# Patient Record
Sex: MALE | HISPANIC OR LATINO | URBAN - METROPOLITAN AREA
[De-identification: names, ages, dates, MRNs, and addresses within clinical notes are randomized per-mention and may not be internally consistent; named-entity substitution may affect disease eponyms.]

---

## 2024-05-01 ENCOUNTER — OFFICE (OUTPATIENT)
Facility: LOCATION | Age: 19
Setting detail: OPHTHALMOLOGY
End: 2024-05-01
Payer: COMMERCIAL

## 2024-05-01 DIAGNOSIS — H52.223: ICD-10-CM

## 2024-05-01 DIAGNOSIS — H50.012: ICD-10-CM

## 2024-05-01 DIAGNOSIS — Q15.9: ICD-10-CM

## 2024-05-01 PROBLEM — H52.13 MYOPIA; BOTH EYES: Status: ACTIVE | Noted: 2024-05-01

## 2024-05-01 PROBLEM — G40.909 SEIZURES, RECURRENT: Status: ACTIVE | Noted: 2024-05-01

## 2024-05-01 PROBLEM — H53.023 AMBLYOPIA REFRACTIVE; BOTH EYES: Status: ACTIVE | Noted: 2024-05-01

## 2024-05-01 PROBLEM — H55.02 NYSTAGMUS, LATENT: Status: ACTIVE | Noted: 2024-05-01

## 2024-05-01 PROCEDURE — 92060 SENSORIMOTOR EXAMINATION: CPT | Performed by: OPHTHALMOLOGY

## 2024-05-01 PROCEDURE — 99204 OFFICE O/P NEW MOD 45 MIN: CPT | Performed by: OPHTHALMOLOGY

## 2024-05-01 PROCEDURE — 92250 FUNDUS PHOTOGRAPHY W/I&R: CPT | Performed by: OPHTHALMOLOGY

## 2024-05-01 PROCEDURE — 92285 EXTERNAL OCULAR PHOTOGRAPHY: CPT | Performed by: OPHTHALMOLOGY

## 2024-05-01 PROCEDURE — 92015 DETERMINE REFRACTIVE STATE: CPT | Performed by: OPHTHALMOLOGY

## 2024-05-01 ASSESSMENT — CONFRONTATIONAL VISUAL FIELD TEST (CVF)
OD_FINDINGS: FULL
OS_FINDINGS: FULL

## 2024-07-01 ENCOUNTER — OFFICE (OUTPATIENT)
Facility: LOCATION | Age: 19
Setting detail: OPHTHALMOLOGY
End: 2024-07-01
Payer: COMMERCIAL

## 2024-07-01 DIAGNOSIS — H53.023: ICD-10-CM

## 2024-07-01 DIAGNOSIS — H55.02: ICD-10-CM

## 2024-07-01 DIAGNOSIS — G40.909: ICD-10-CM

## 2024-07-01 DIAGNOSIS — H50.012: ICD-10-CM

## 2024-07-01 PROCEDURE — 92012 INTRM OPH EXAM EST PATIENT: CPT | Performed by: OPHTHALMOLOGY

## 2024-07-01 PROCEDURE — 92060 SENSORIMOTOR EXAMINATION: CPT | Performed by: OPHTHALMOLOGY

## 2024-07-01 ASSESSMENT — CONFRONTATIONAL VISUAL FIELD TEST (CVF)
OS_FINDINGS: FULL
OD_FINDINGS: FULL

## 2024-09-24 ENCOUNTER — OUTPATIENT HOSPITAL (OUTPATIENT)
Dept: URBAN - METROPOLITAN AREA CLINIC 33 | Facility: CLINIC | Age: 19
Setting detail: OPHTHALMOLOGY
End: 2024-09-24
Payer: COMMERCIAL

## 2024-09-24 ENCOUNTER — OUTPATIENT (OUTPATIENT)
Dept: OUTPATIENT SERVICES | Facility: HOSPITAL | Age: 19
LOS: 1 days | End: 2024-09-24
Payer: MEDICAID

## 2024-09-24 VITALS
DIASTOLIC BLOOD PRESSURE: 71 MMHG | OXYGEN SATURATION: 98 % | HEART RATE: 65 BPM | SYSTOLIC BLOOD PRESSURE: 122 MMHG | RESPIRATION RATE: 12 BRPM

## 2024-09-24 VITALS
DIASTOLIC BLOOD PRESSURE: 72 MMHG | SYSTOLIC BLOOD PRESSURE: 127 MMHG | RESPIRATION RATE: 16 BRPM | HEART RATE: 88 BPM | OXYGEN SATURATION: 100 % | TEMPERATURE: 98 F | HEIGHT: 70.5 IN | WEIGHT: 229.28 LBS

## 2024-09-24 DIAGNOSIS — H50.122: ICD-10-CM

## 2024-09-24 DIAGNOSIS — H50.012: ICD-10-CM

## 2024-09-24 PROCEDURE — ZZZZZ: CPT

## 2024-09-24 PROCEDURE — 67312 REVISE TWO EYE MUSCLES: CPT | Mod: LT | Performed by: OPHTHALMOLOGY

## 2024-09-24 PROCEDURE — 67312 REVISE TWO EYE MUSCLES: CPT | Mod: LT

## 2024-09-24 RX ORDER — LEVETIRACETAM 1000 MG
1 TABLET ORAL
Refills: 0 | DISCHARGE

## 2024-09-24 NOTE — ASU DISCHARGE PLAN (ADULT/PEDIATRIC) - ASU DC SPECIAL INSTRUCTIONSFT
Continue Home Medication regimen as per your Primary Care Provider    Follow up in Dr. Tinsley's office tomorrow

## 2024-09-24 NOTE — ASU PATIENT PROFILE, ADULT - IS PATIENT PREGNANT?
----- Message from Thor Mckeon [id number]  ---------------------------------------------------------------------------  --------------  SCRIPT ANSWERS  Relationship to Patient?  Self not applicable (Male)

## 2024-09-24 NOTE — ASU PATIENT PROFILE, ADULT - FALL HARM RISK - HARM RISK INTERVENTIONS
Assistance with ambulation/Communicate Risk of Fall with Harm to all staff/Monitor for mental status changes/Monitor gait and stability/Reinforce activity limits and safety measures with patient and family/Tailored Fall Risk Interventions/Visual Cue: Yellow wristband and red socks/Bed in lowest position, wheels locked, appropriate side rails in place/Call bell, personal items and telephone in reach/Instruct patient to call for assistance before getting out of bed or chair/Non-slip footwear when patient is out of bed/[place] to call system/Physically safe environment - no spills, clutter or unnecessary equipment/Purposeful Proactive Rounding/Room/bathroom lighting operational, light cord in reach

## 2024-09-24 NOTE — ASU DISCHARGE PLAN (ADULT/PEDIATRIC) - CARE PROVIDER_API CALL
Mariann Tinsley  Ophthalmology  [address] [place] [address]  Phone: [phone]  Fax: [phone]  Scheduled Appointment: 09/25/2024

## 2024-09-25 ENCOUNTER — OFFICE (OUTPATIENT)
Facility: LOCATION | Age: 19
Setting detail: OPHTHALMOLOGY
End: 2024-09-25
Payer: COMMERCIAL

## 2024-09-25 DIAGNOSIS — H50.012: ICD-10-CM

## 2024-09-25 PROCEDURE — 99024 POSTOP FOLLOW-UP VISIT: CPT | Performed by: OPHTHALMOLOGY

## 2024-09-25 ASSESSMENT — CONFRONTATIONAL VISUAL FIELD TEST (CVF)
OD_FINDINGS: FULL
OS_FINDINGS: FULL

## 2024-10-07 ENCOUNTER — OFFICE (OUTPATIENT)
Facility: LOCATION | Age: 19
Setting detail: OPHTHALMOLOGY
End: 2024-10-07
Payer: COMMERCIAL

## 2024-10-07 ENCOUNTER — RX ONLY (RX ONLY)
Age: 19
End: 2024-10-07

## 2024-10-07 DIAGNOSIS — H50.012: ICD-10-CM

## 2024-10-07 PROCEDURE — 99024 POSTOP FOLLOW-UP VISIT: CPT | Performed by: OPHTHALMOLOGY

## 2024-10-07 ASSESSMENT — REFRACTION_AUTOREFRACTION
OS_AXIS: 178
OS_AXIS: 176
OD_CYLINDER: -2.25
OD_CYLINDER: -2.50
OS_CYLINDER: -2.25
OS_CYLINDER: -1.75
OD_AXIS: 3
OD_AXIS: 178
OD_SPHERE: -3.00
OD_SPHERE: -5.75
OS_SPHERE: -2.25
OS_SPHERE: -3.50

## 2024-10-07 ASSESSMENT — KERATOMETRY
OS_AXISANGLE_DEGREES: 88
OD_AXISANGLE_DEGREES: 91
OD_K1POWER_DIOPTERS: 42.00
OD_K2POWER_DIOPTERS: 44.50
OS_K2POWER_DIOPTERS: 44.50
OS_K1POWER_DIOPTERS: 42.25

## 2024-10-07 ASSESSMENT — REFRACTION_MANIFEST
OD_SPHERE: -3.50
OD_CYLINDER: -2.25
OS_AXIS: 175
OS_CYLINDER: -2.00
OD_VA1: 20/40+
OS_SPHERE: -3.00
OD_AXIS: 180
OS_VA1: 20/60-

## 2024-10-07 ASSESSMENT — VISUAL ACUITY
OD_BCVA: 20/150
OS_BCVA: 20/60-1

## 2024-10-07 ASSESSMENT — REFRACTION_CURRENTRX
OS_VPRISM_DIRECTION: SV
OD_SPHERE: -3.50
OD_OVR_VA: 20/
OD_AXIS: 4
OS_AXIS: 168
OD_VPRISM_DIRECTION: SV
OS_SPHERE: -3.00
OD_CYLINDER: -2.25
OS_CYLINDER: -2.00
OS_OVR_VA: 20/

## 2024-10-07 ASSESSMENT — CONFRONTATIONAL VISUAL FIELD TEST (CVF)
OD_FINDINGS: FULL
OS_FINDINGS: FULL

## 2024-11-13 ENCOUNTER — OFFICE (OUTPATIENT)
Facility: LOCATION | Age: 19
Setting detail: OPHTHALMOLOGY
End: 2024-11-13
Payer: COMMERCIAL

## 2024-11-13 DIAGNOSIS — H50.112: ICD-10-CM

## 2024-11-13 DIAGNOSIS — H50.012: ICD-10-CM

## 2024-11-13 PROCEDURE — 99024 POSTOP FOLLOW-UP VISIT: CPT | Performed by: OPHTHALMOLOGY

## 2024-11-13 ASSESSMENT — REFRACTION_MANIFEST
OD_CYLINDER: -2.25
OS_CYLINDER: -2.00
OS_AXIS: 175
OS_SPHERE: -3.00
OD_SPHERE: -3.50
OD_AXIS: 180
OD_VA1: 20/40+
OS_VA1: 20/60-

## 2024-11-13 ASSESSMENT — KERATOMETRY
OS_K1POWER_DIOPTERS: 42.25
OD_K2POWER_DIOPTERS: 44.25
OS_AXISANGLE_DEGREES: 090
OD_AXISANGLE_DEGREES: 092
OS_K2POWER_DIOPTERS: 44.25
OD_K1POWER_DIOPTERS: 42.00

## 2024-11-13 ASSESSMENT — REFRACTION_AUTOREFRACTION
OD_CYLINDER: -2.25
OD_AXIS: 003
OS_CYLINDER: -2.25
OS_SPHERE: -2.50
OD_CYLINDER: -2.25
OS_AXIS: 178
OS_SPHERE: -2.25
OD_SPHERE: -3.00
OD_SPHERE: -3.75
OS_AXIS: 179
OD_AXIS: 178
OS_CYLINDER: -1.75

## 2024-11-13 ASSESSMENT — REFRACTION_CURRENTRX
OS_CYLINDER: -2.00
OD_VPRISM_DIRECTION: SV
OD_AXIS: 3
OD_SPHERE: -3.50
OS_VPRISM_DIRECTION: SV
OS_OVR_VA: 20/
OD_CYLINDER: -2.25
OS_AXIS: 171
OS_SPHERE: -3.00
OD_OVR_VA: 20/

## 2024-11-13 ASSESSMENT — VISUAL ACUITY
OS_BCVA: 20/40
OD_BCVA: 20/60

## 2025-01-27 ENCOUNTER — OFFICE (OUTPATIENT)
Facility: LOCATION | Age: 20
Setting detail: OPHTHALMOLOGY
End: 2025-01-27
Payer: COMMERCIAL

## 2025-01-27 DIAGNOSIS — H52.13: ICD-10-CM

## 2025-01-27 DIAGNOSIS — H55.02: ICD-10-CM

## 2025-01-27 DIAGNOSIS — G40.909: ICD-10-CM

## 2025-01-27 DIAGNOSIS — H53.023: ICD-10-CM

## 2025-01-27 DIAGNOSIS — Q15.9: ICD-10-CM

## 2025-01-27 DIAGNOSIS — H50.112: ICD-10-CM

## 2025-01-27 DIAGNOSIS — H52.223: ICD-10-CM

## 2025-01-27 PROCEDURE — 92060 SENSORIMOTOR EXAMINATION: CPT | Performed by: OPHTHALMOLOGY

## 2025-01-27 PROCEDURE — 92012 INTRM OPH EXAM EST PATIENT: CPT | Performed by: OPHTHALMOLOGY

## 2025-01-27 PROCEDURE — 92285 EXTERNAL OCULAR PHOTOGRAPHY: CPT | Performed by: OPHTHALMOLOGY

## 2025-01-27 ASSESSMENT — REFRACTION_CURRENTRX
OS_SPHERE: -3.00
OD_CYLINDER: -2.25
OS_CYLINDER: -2.00
OS_VPRISM_DIRECTION: SV
OS_OVR_VA: 20/
OD_OVR_VA: 20/
OS_AXIS: 169
OD_SPHERE: -3.50
OD_VPRISM_DIRECTION: SV
OD_AXIS: 175

## 2025-01-27 ASSESSMENT — REFRACTION_AUTOREFRACTION
OD_CYLINDER: -2.25
OD_SPHERE: -3.00
OS_SPHERE: -2.25
OD_AXIS: 178
OS_CYLINDER: -2.00
OD_CYLINDER: -2.25
OS_SPHERE: -3.50
OS_CYLINDER: -2.25
OD_SPHERE: -4.00
OD_AXIS: 5
OS_AXIS: 178
OS_AXIS: 178

## 2025-01-27 ASSESSMENT — REFRACTION_MANIFEST
OD_CYLINDER: -2.25
OS_SPHERE: -3.00
OS_AXIS: 175
OD_VA1: 20/40+
OS_VA1: 20/60-
OD_SPHERE: -3.50
OS_CYLINDER: -2.00
OD_AXIS: 180

## 2025-01-27 ASSESSMENT — KERATOMETRY
OD_K1POWER_DIOPTERS: 42.00
OS_K2POWER_DIOPTERS: 44.50
OD_AXISANGLE_DEGREES: 95
OS_K1POWER_DIOPTERS: 42.25
OS_AXISANGLE_DEGREES: 91
OD_K2POWER_DIOPTERS: 44.50

## 2025-01-27 ASSESSMENT — VISUAL ACUITY
OD_BCVA: 20/60
OS_BCVA: 20/40-

## 2025-01-27 ASSESSMENT — CONFRONTATIONAL VISUAL FIELD TEST (CVF)
OS_FINDINGS: FULL
OD_FINDINGS: FULL

## 2025-05-12 ENCOUNTER — OFFICE (OUTPATIENT)
Facility: LOCATION | Age: 20
Setting detail: OPHTHALMOLOGY
End: 2025-05-12
Payer: COMMERCIAL

## 2025-05-12 DIAGNOSIS — H52.13: ICD-10-CM

## 2025-05-12 DIAGNOSIS — Q15.9: ICD-10-CM

## 2025-05-12 DIAGNOSIS — G40.909: ICD-10-CM

## 2025-05-12 DIAGNOSIS — H50.112: ICD-10-CM

## 2025-05-12 PROCEDURE — 92014 COMPRE OPH EXAM EST PT 1/>: CPT | Performed by: OPHTHALMOLOGY

## 2025-05-12 PROCEDURE — 92015 DETERMINE REFRACTIVE STATE: CPT | Performed by: OPHTHALMOLOGY

## 2025-05-12 PROCEDURE — 92285 EXTERNAL OCULAR PHOTOGRAPHY: CPT | Performed by: OPHTHALMOLOGY

## 2025-05-12 PROCEDURE — 92060 SENSORIMOTOR EXAMINATION: CPT | Performed by: OPHTHALMOLOGY

## 2025-05-12 PROCEDURE — 92250 FUNDUS PHOTOGRAPHY W/I&R: CPT | Performed by: OPHTHALMOLOGY

## 2025-05-12 ASSESSMENT — KERATOMETRY
OS_AXISANGLE_DEGREES: 91
OS_K2POWER_DIOPTERS: 44.50
OD_K2POWER_DIOPTERS: 44.50
OD_K1POWER_DIOPTERS: 42.00
OS_K1POWER_DIOPTERS: 42.25
OD_AXISANGLE_DEGREES: 95

## 2025-05-12 ASSESSMENT — CONFRONTATIONAL VISUAL FIELD TEST (CVF)
OS_FINDINGS: FULL
OD_FINDINGS: FULL

## 2025-05-12 ASSESSMENT — REFRACTION_CURRENTRX
OD_VPRISM_DIRECTION: SV
OD_SPHERE: -3.50
OS_CYLINDER: -2.00
OS_VPRISM_DIRECTION: SV
OD_OVR_VA: 20/
OD_CYLINDER: -2.25
OS_AXIS: 174
OD_AXIS: 179
OS_SPHERE: -3.00
OS_OVR_VA: 20/

## 2025-05-12 ASSESSMENT — REFRACTION_MANIFEST
OD_VA1: 20/25-
OS_CYLINDER: -1.75
OS_VA1: 20/70+2
OS_SPHERE: -2.75
OD_SPHERE: -3.25
OD_CYLINDER: -2.00
OD_AXIS: 005
OS_AXIS: 175

## 2025-05-12 ASSESSMENT — REFRACTION_AUTOREFRACTION
OD_CYLINDER: -2.25
OD_SPHERE: -4.00
OD_SPHERE: -3.00
OS_SPHERE: -2.25
OS_CYLINDER: -2.00
OS_CYLINDER: -1.75
OS_AXIS: 177
OS_AXIS: 178
OD_AXIS: 004
OD_AXIS: 5
OS_SPHERE: -3.50
OD_CYLINDER: -2.00

## 2025-05-12 ASSESSMENT — VISUAL ACUITY
OD_BCVA: 20/60-
OS_BCVA: 20/40-2

## 2025-06-09 ENCOUNTER — OFFICE (OUTPATIENT)
Facility: LOCATION | Age: 20
Setting detail: OPHTHALMOLOGY
End: 2025-06-09
Payer: COMMERCIAL

## 2025-06-09 DIAGNOSIS — G40.909: ICD-10-CM

## 2025-06-09 DIAGNOSIS — Q15.9: ICD-10-CM

## 2025-06-09 DIAGNOSIS — H50.112: ICD-10-CM

## 2025-06-09 PROCEDURE — 92012 INTRM OPH EXAM EST PATIENT: CPT | Performed by: OPHTHALMOLOGY

## 2025-06-09 PROCEDURE — 92060 SENSORIMOTOR EXAMINATION: CPT | Performed by: OPHTHALMOLOGY

## 2025-06-09 ASSESSMENT — REFRACTION_AUTOREFRACTION
OD_SPHERE: -3.00
OS_SPHERE: -3.00
OS_CYLINDER: -1.75
OD_CYLINDER: -2.00
OD_SPHERE: -3.25
OD_CYLINDER: -2.00
OS_SPHERE: -2.25
OS_AXIS: 177
OD_AXIS: 004
OS_CYLINDER: -1.75
OD_AXIS: 5
OS_AXIS: 174

## 2025-06-09 ASSESSMENT — REFRACTION_MANIFEST
OS_CYLINDER: -1.75
OD_CYLINDER: -2.00
OS_VA1: 20/70+2
OD_SPHERE: -3.25
OD_AXIS: 005
OD_VA1: 20/25-
OS_AXIS: 175
OS_SPHERE: -2.75

## 2025-06-09 ASSESSMENT — KERATOMETRY
OD_K2POWER_DIOPTERS: 44.25
OS_K1POWER_DIOPTERS: 42.25
OS_K2POWER_DIOPTERS: 44.25
OD_K1POWER_DIOPTERS: 42.00
OD_AXISANGLE_DEGREES: 94
OS_AXISANGLE_DEGREES: 88

## 2025-06-09 ASSESSMENT — REFRACTION_CURRENTRX
OS_AXIS: 173
OS_OVR_VA: 20/
OD_CYLINDER: -2.25
OS_VPRISM_DIRECTION: SV
OD_OVR_VA: 20/
OS_SPHERE: -3.00
OD_SPHERE: -3.50
OS_CYLINDER: -2.00
OD_VPRISM_DIRECTION: SV
OD_AXIS: 180

## 2025-06-09 ASSESSMENT — VISUAL ACUITY
OS_BCVA: 20/40-2
OD_BCVA: 20/60-

## 2025-06-23 PROBLEM — Z87.898 PERSONAL HISTORY OF OTHER SPECIFIED CONDITIONS: Chronic | Status: ACTIVE | Noted: 2024-09-24

## 2025-07-07 ENCOUNTER — OUTPATIENT HOSPITAL (OUTPATIENT)
Dept: URBAN - METROPOLITAN AREA CLINIC 33 | Facility: CLINIC | Age: 20
Setting detail: OPHTHALMOLOGY
End: 2025-07-07
Payer: COMMERCIAL

## 2025-07-07 ENCOUNTER — OUTPATIENT (OUTPATIENT)
Dept: OUTPATIENT SERVICES | Facility: HOSPITAL | Age: 20
LOS: 1 days | End: 2025-07-07
Payer: MEDICAID

## 2025-07-07 VITALS — DIASTOLIC BLOOD PRESSURE: 74 MMHG | SYSTOLIC BLOOD PRESSURE: 126 MMHG | HEART RATE: 61 BPM | RESPIRATION RATE: 12 BRPM

## 2025-07-07 VITALS
HEART RATE: 54 BPM | RESPIRATION RATE: 14 BRPM | TEMPERATURE: 98 F | WEIGHT: 221.79 LBS | SYSTOLIC BLOOD PRESSURE: 121 MMHG | HEIGHT: 70.5 IN | OXYGEN SATURATION: 100 % | DIASTOLIC BLOOD PRESSURE: 74 MMHG

## 2025-07-07 DIAGNOSIS — Z98.890 OTHER SPECIFIED POSTPROCEDURAL STATES: Chronic | ICD-10-CM

## 2025-07-07 DIAGNOSIS — H50.112 MONOCULAR EXOTROPIA, LEFT EYE: ICD-10-CM

## 2025-07-07 DIAGNOSIS — H50.112: ICD-10-CM

## 2025-07-07 PROCEDURE — 67311 REVISE EYE MUSCLE: CPT | Mod: LT | Performed by: OPHTHALMOLOGY

## 2025-07-07 PROCEDURE — 67332 REREVISE EYE MUSCLES ADD-ON: CPT | Mod: LT | Performed by: OPHTHALMOLOGY

## 2025-07-07 PROCEDURE — 67332 REREVISE EYE MUSCLES ADD-ON: CPT | Mod: LT

## 2025-07-07 PROCEDURE — ZZZZZ: CPT

## 2025-07-07 PROCEDURE — 67311 REVISE EYE MUSCLE: CPT | Mod: LT

## 2025-07-07 NOTE — ASU DISCHARGE PLAN (ADULT/PEDIATRIC) - CARE PROVIDER_API CALL
Mariann Tinsley  Ophthalmology  [address] [place] [address]  Phone: [phone]  Fax: [phone]  Scheduled Appointment: 07/08/2025

## 2025-07-07 NOTE — ASU DISCHARGE PLAN (ADULT/PEDIATRIC) - FINANCIAL ASSISTANCE
[facility] provides services at a reduced cost to those who are determined to be eligible through [facility]’s financial assistance program. Information regarding [facility]’s financial assistance program can be found by going to [email or web address] or by calling [phone].

## 2025-07-07 NOTE — ASU DISCHARGE PLAN (ADULT/PEDIATRIC) - NS MD DC FALL RISK RISK
For information on Fall & Injury Prevention, visit: [email or web address] OR  [email or web address] OR  https://www.cdc.gov/steadi/patient.html

## 2025-07-07 NOTE — ASU DISCHARGE PLAN (ADULT/PEDIATRIC) - ASU DC SPECIAL INSTRUCTIONSFT
Continue Home Medication Regimen as per your Primary Care Provider    Begin : Maxitrol (Neomycin / Polymyxin B / Dexamethasone ) eye drop to Left Eye 4 times a day    Follow up in Dr. Tinsley's office tomorrow between 10 and 12

## 2025-07-07 NOTE — ASU PATIENT PROFILE, ADULT - PATIENT REPRESENTATIVE NAME
Continue with maintenance inhalers.  Patient was given 1 box of Dupixent samples at this visit while she is waiting for approval from insurance.   Continue with scheduled follow up for sleep apnea with compliance report.    
Alberta Tyson, mother

## 2025-07-07 NOTE — ASU PATIENT PROFILE, ADULT - FALL HARM RISK - HARM RISK INTERVENTIONS

## 2025-07-07 NOTE — ASU DISCHARGE PLAN (ADULT/PEDIATRIC) - NURSING INSTRUCTIONS
Pt tolerated po intake well. Do not rub the eye   [facility] interp for parents ID #[id number] phuc

## 2025-07-08 ENCOUNTER — OFFICE (OUTPATIENT)
Facility: LOCATION | Age: 20
Setting detail: OPHTHALMOLOGY
End: 2025-07-08
Payer: COMMERCIAL

## 2025-07-08 DIAGNOSIS — H50.112: ICD-10-CM

## 2025-07-08 PROCEDURE — 99024 POSTOP FOLLOW-UP VISIT: CPT | Performed by: OPHTHALMOLOGY

## 2025-07-08 ASSESSMENT — REFRACTION_MANIFEST
OD_VA1: 20/25-
OD_SPHERE: -3.25
OD_CYLINDER: -2.00
OD_AXIS: 005
OS_AXIS: 175
OS_VA1: 20/70+2
OS_SPHERE: -2.75
OS_CYLINDER: -1.75

## 2025-07-08 ASSESSMENT — REFRACTION_CURRENTRX
OD_OVR_VA: 20/
OS_VPRISM_DIRECTION: SV
OS_AXIS: 173
OS_CYLINDER: -2.00
OD_AXIS: 180
OS_SPHERE: -3.00
OD_SPHERE: -3.50
OD_CYLINDER: -2.25
OS_OVR_VA: 20/
OD_VPRISM_DIRECTION: SV

## 2025-07-08 ASSESSMENT — REFRACTION_AUTOREFRACTION
OS_CYLINDER: -2.50
OD_CYLINDER: -2.50
OD_SPHERE: -3.00
OD_AXIS: 014
OS_SPHERE: -2.25
OS_AXIS: 177
OS_SPHERE: -2.25
OD_SPHERE: -3.00
OD_CYLINDER: -2.00
OS_CYLINDER: -1.75
OD_AXIS: 004
OS_AXIS: 014

## 2025-07-08 ASSESSMENT — KERATOMETRY
OD_AXISANGLE_DEGREES: 095
OD_K1POWER_DIOPTERS: 42.00
OS_K2POWER_DIOPTERS: 45.25
OS_AXISANGLE_DEGREES: 101
OD_K2POWER_DIOPTERS: 44.25
OS_K1POWER_DIOPTERS: 41.75

## 2025-07-08 ASSESSMENT — VISUAL ACUITY
OS_BCVA: 20/60-1
OD_BCVA: 20/125-1

## 2025-07-16 ENCOUNTER — OFFICE (OUTPATIENT)
Facility: LOCATION | Age: 20
Setting detail: OPHTHALMOLOGY
End: 2025-07-16
Payer: COMMERCIAL

## 2025-07-16 DIAGNOSIS — H50.112: ICD-10-CM

## 2025-07-16 PROCEDURE — 99024 POSTOP FOLLOW-UP VISIT: CPT | Performed by: OPHTHALMOLOGY

## 2025-07-16 ASSESSMENT — REFRACTION_AUTOREFRACTION
OD_CYLINDER: -2.00
OS_AXIS: 177
OD_AXIS: 004
OD_AXIS: 3
OD_SPHERE: -3.00
OS_CYLINDER: -1.50
OS_AXIS: 178
OS_SPHERE: -2.25
OS_SPHERE: -3.75
OS_CYLINDER: -1.75
OD_CYLINDER: -2.00
OD_SPHERE: -3.00

## 2025-07-16 ASSESSMENT — REFRACTION_CURRENTRX
OD_CYLINDER: -2.25
OD_VPRISM_DIRECTION: SV
OS_OVR_VA: 20/
OS_SPHERE: -3.00
OD_AXIS: 180
OS_AXIS: 173
OD_OVR_VA: 20/
OS_CYLINDER: -2.00
OD_SPHERE: -3.50
OS_VPRISM_DIRECTION: SV

## 2025-07-16 ASSESSMENT — KERATOMETRY
OD_K1POWER_DIOPTERS: 42.00
OS_K1POWER_DIOPTERS: 42.50
OD_K2POWER_DIOPTERS: 44.25
OS_K2POWER_DIOPTERS: 44.25
OS_AXISANGLE_DEGREES: 88
OD_AXISANGLE_DEGREES: 92

## 2025-07-16 ASSESSMENT — VISUAL ACUITY
OD_BCVA: 20/100
OS_BCVA: 20/80

## 2025-07-16 ASSESSMENT — REFRACTION_MANIFEST
OD_AXIS: 005
OD_SPHERE: -3.25
OD_VA1: 20/25-
OS_SPHERE: -2.75
OS_VA1: 20/70+2
OS_AXIS: 175
OS_CYLINDER: -1.75
OD_CYLINDER: -2.00

## 2025-07-16 ASSESSMENT — CONFRONTATIONAL VISUAL FIELD TEST (CVF)
OD_FINDINGS: FULL
OS_FINDINGS: FULL

## 2025-08-13 ENCOUNTER — APPOINTMENT (OUTPATIENT)
Dept: NEUROLOGY | Facility: CLINIC | Age: 20
End: 2025-08-13
Payer: MEDICAID

## 2025-08-13 ENCOUNTER — NON-APPOINTMENT (OUTPATIENT)
Age: 20
End: 2025-08-13

## 2025-08-13 VITALS
HEART RATE: 67 BPM | HEIGHT: 70 IN | TEMPERATURE: 98.1 F | BODY MASS INDEX: 31.5 KG/M2 | DIASTOLIC BLOOD PRESSURE: 76 MMHG | WEIGHT: 220 LBS | SYSTOLIC BLOOD PRESSURE: 127 MMHG

## 2025-08-13 VITALS
DIASTOLIC BLOOD PRESSURE: 76 MMHG | WEIGHT: 220 LBS | TEMPERATURE: 98.1 F | HEIGHT: 70 IN | SYSTOLIC BLOOD PRESSURE: 127 MMHG | BODY MASS INDEX: 31.5 KG/M2 | HEART RATE: 67 BPM

## 2025-08-13 DIAGNOSIS — G40.309 GENERALIZED IDIOPATHIC EPILEPSY AND EPILEPTIC SYNDROMES, NOT INTRACTABLE, W/OUT STATUS EPILEPTICUS: ICD-10-CM

## 2025-08-13 DIAGNOSIS — Z78.9 OTHER SPECIFIED HEALTH STATUS: ICD-10-CM

## 2025-08-13 PROBLEM — Z00.00 ENCOUNTER FOR PREVENTIVE HEALTH EXAMINATION: Status: ACTIVE | Noted: 2025-08-13

## 2025-08-13 PROCEDURE — 99205 OFFICE O/P NEW HI 60 MIN: CPT

## 2025-08-13 PROCEDURE — G2211 COMPLEX E/M VISIT ADD ON: CPT | Mod: NC

## 2025-08-13 RX ORDER — LEVETIRACETAM 1000 MG/1
1000 TABLET, FILM COATED ORAL
Refills: 0 | Status: ACTIVE | COMMUNITY

## 2025-08-13 RX ORDER — LEVETIRACETAM 1000 MG/1
1000 TABLET, FILM COATED ORAL
Qty: 60 | Refills: 6 | Status: ACTIVE | COMMUNITY
Start: 2025-08-13 | End: 1900-01-01

## 2025-08-19 ENCOUNTER — OFFICE (OUTPATIENT)
Facility: LOCATION | Age: 20
Setting detail: OPHTHALMOLOGY
End: 2025-08-19
Payer: COMMERCIAL

## 2025-08-19 DIAGNOSIS — H50.112: ICD-10-CM

## 2025-08-19 PROCEDURE — 99024 POSTOP FOLLOW-UP VISIT: CPT | Performed by: OPHTHALMOLOGY

## 2025-08-19 ASSESSMENT — CONFRONTATIONAL VISUAL FIELD TEST (CVF)
OD_FINDINGS: FULL
OS_FINDINGS: FULL

## 2025-08-20 ASSESSMENT — REFRACTION_MANIFEST
OS_AXIS: 175
OS_CYLINDER: -1.75
OD_CYLINDER: -2.00
OS_SPHERE: -2.75
OD_SPHERE: -3.25
OD_AXIS: 005
OS_VA1: 20/70+2
OD_VA1: 20/25-

## 2025-08-20 ASSESSMENT — REFRACTION_AUTOREFRACTION
OS_SPHERE: -3.25
OS_CYLINDER: -1.50
OD_AXIS: 004
OD_SPHERE: -3.00
OS_CYLINDER: -1.75
OD_AXIS: 007
OD_CYLINDER: -2.00
OS_AXIS: 001
OS_SPHERE: -2.25
OD_SPHERE: -4.25
OS_AXIS: 177
OD_CYLINDER: -1.75

## 2025-08-20 ASSESSMENT — VISUAL ACUITY
OS_BCVA: 20/30-
OD_BCVA: 20/70+

## 2025-08-20 ASSESSMENT — KERATOMETRY
OS_K2POWER_DIOPTERS: 44.25
OS_AXISANGLE_DEGREES: 091
OD_AXISANGLE_DEGREES: 095
OD_K2POWER_DIOPTERS: 44.25
OS_K1POWER_DIOPTERS: 42.25
OD_K1POWER_DIOPTERS: 42.00

## 2025-08-20 ASSESSMENT — REFRACTION_CURRENTRX
OS_VPRISM_DIRECTION: SV
OD_SPHERE: -3.50
OD_AXIS: 001
OS_OVR_VA: 20/
OS_CYLINDER: -2.00
OS_SPHERE: -3.00
OD_CYLINDER: -2.25
OD_OVR_VA: 20/
OS_AXIS: 169
OD_VPRISM_DIRECTION: SV

## 2025-08-21 ENCOUNTER — APPOINTMENT (OUTPATIENT)
Dept: MRI IMAGING | Facility: HOSPITAL | Age: 20
End: 2025-08-21

## 2025-08-21 ENCOUNTER — OUTPATIENT (OUTPATIENT)
Dept: OUTPATIENT SERVICES | Facility: HOSPITAL | Age: 20
LOS: 1 days | End: 2025-08-21
Payer: MEDICAID

## 2025-08-21 DIAGNOSIS — G40.309 GENERALIZED IDIOPATHIC EPILEPSY AND EPILEPTIC SYNDROMES, NOT INTRACTABLE, WITHOUT STATUS EPILEPTICUS: ICD-10-CM

## 2025-08-21 DIAGNOSIS — Z98.890 OTHER SPECIFIED POSTPROCEDURAL STATES: Chronic | ICD-10-CM

## 2025-08-21 PROCEDURE — 70553 MRI BRAIN STEM W/O & W/DYE: CPT

## 2025-08-21 PROCEDURE — A9579: CPT

## 2025-08-21 PROCEDURE — 70553 MRI BRAIN STEM W/O & W/DYE: CPT | Mod: 26

## 2025-09-03 ENCOUNTER — NON-APPOINTMENT (OUTPATIENT)
Age: 20
End: 2025-09-03

## (undated) DEVICE — SUT VICRYL 6-0 18" S-28 DA

## (undated) DEVICE — SUT SOFSILK 4-0 30" CV-23

## (undated) DEVICE — VENODYNE/SCD SLEEVE CALF MEDIUM

## (undated) DEVICE — CAUT SURG OPTH BATTERY OP LO/FN

## (undated) DEVICE — SOL BALANCE SALT 15ML

## (undated) DEVICE — ELCTR BVI ACCU-TEMP CAUTERY SHAFT FINE TIP 1/2"

## (undated) DEVICE — Device

## (undated) DEVICE — APPLICATOR COTTON TIP 3" STERILE

## (undated) DEVICE — SUT VICRYL 6-0 12" S-29 DA

## (undated) DEVICE — GLV 6 PROTEXIS (WHITE)

## (undated) DEVICE — SUT PLAIN GUT 6-0 18" TG140-8

## (undated) DEVICE — PACK VITRECTOMY

## (undated) DEVICE — WARMING BLANKET LOWER ADULT